# Patient Record
Sex: FEMALE | Race: OTHER | Employment: OTHER | URBAN - METROPOLITAN AREA
[De-identification: names, ages, dates, MRNs, and addresses within clinical notes are randomized per-mention and may not be internally consistent; named-entity substitution may affect disease eponyms.]

---

## 2020-10-29 ENCOUNTER — HOSPITAL ENCOUNTER (OUTPATIENT)
Dept: GENERAL RADIOLOGY | Age: 80
Discharge: HOME | End: 2020-10-29
Payer: MEDICARE

## 2020-10-29 ENCOUNTER — HOSPITAL ENCOUNTER (OUTPATIENT)
Dept: MRI IMAGING | Age: 80
Discharge: HOME | End: 2020-10-29
Payer: MEDICARE

## 2020-10-29 DIAGNOSIS — M19.90 OSTEOARTHRITIS, UNSPECIFIED OSTEOARTHRITIS TYPE, UNSPECIFIED SITE: ICD-10-CM

## 2020-10-29 DIAGNOSIS — M54.16 LUMBAR RADICULOPATHY: ICD-10-CM

## 2020-10-29 PROCEDURE — 72148 MRI LUMBAR SPINE W/O DYE: CPT | Performed by: PAIN MEDICINE

## 2020-10-29 PROCEDURE — 73502 X-RAY EXAM HIP UNI 2-3 VIEWS: CPT | Performed by: PAIN MEDICINE

## 2021-07-15 ENCOUNTER — HOSPITAL ENCOUNTER (OUTPATIENT)
Dept: MRI IMAGING | Age: 81
Discharge: HOME | End: 2021-07-15
Payer: MEDICARE

## 2021-07-15 DIAGNOSIS — M54.50 LUMBAR PAIN: ICD-10-CM

## 2021-07-15 DIAGNOSIS — M54.16 LUMBAR RADICULOPATHY: ICD-10-CM

## 2021-07-15 DIAGNOSIS — M54.50 LOW BACK PAIN: ICD-10-CM

## 2021-07-15 DIAGNOSIS — M25.551 RIGHT HIP PAIN: ICD-10-CM

## 2021-07-15 PROCEDURE — 72146 MRI CHEST SPINE W/O DYE: CPT | Performed by: PHYSICIAN ASSISTANT

## 2021-07-15 PROCEDURE — 72146 MRI CHEST SPINE W/O DYE: CPT

## 2021-07-15 PROCEDURE — 73721 MRI JNT OF LWR EXTRE W/O DYE: CPT

## 2021-07-23 ENCOUNTER — HOSPITAL ENCOUNTER (OUTPATIENT)
Dept: MRI IMAGING | Facility: HOSPITAL | Age: 81
Discharge: HOME | End: 2021-07-23
Payer: MEDICARE

## 2021-07-23 DIAGNOSIS — M54.16 LUMBAR RADICULOPATHY: ICD-10-CM

## 2021-07-23 DIAGNOSIS — M54.50 LUMBAR PAIN: ICD-10-CM

## 2021-07-23 DIAGNOSIS — M25.551 RIGHT HIP PAIN: ICD-10-CM

## 2021-07-23 PROCEDURE — 73721 MRI JNT OF LWR EXTRE W/O DYE: CPT

## 2021-07-23 PROCEDURE — 73721 MRI JNT OF LWR EXTRE W/O DYE: CPT | Performed by: PHYSICIAN ASSISTANT

## 2021-08-04 DIAGNOSIS — M16.9 OSTEOARTHROSIS, HIP: Primary | ICD-10-CM

## 2021-09-02 ENCOUNTER — OFFICE VISIT (OUTPATIENT)
Dept: PAIN CLINIC | Facility: HOSPITAL | Age: 81
End: 2021-09-02
Attending: FAMILY MEDICINE
Payer: MEDICARE

## 2021-09-02 VITALS
HEIGHT: 64 IN | BODY MASS INDEX: 33.29 KG/M2 | RESPIRATION RATE: 18 BRPM | HEART RATE: 69 BPM | SYSTOLIC BLOOD PRESSURE: 128 MMHG | DIASTOLIC BLOOD PRESSURE: 69 MMHG | WEIGHT: 195 LBS

## 2021-09-02 DIAGNOSIS — M16.9 OSTEOARTHROSIS, HIP: ICD-10-CM

## 2021-09-02 DIAGNOSIS — M48.062 SPINAL STENOSIS OF LUMBAR REGION WITH NEUROGENIC CLAUDICATION: Primary | ICD-10-CM

## 2021-09-02 PROCEDURE — 99202 OFFICE O/P NEW SF 15 MIN: CPT

## 2021-09-02 RX ORDER — HYDROCODONE BITARTRATE AND ACETAMINOPHEN 5; 325 MG/1; MG/1
TABLET ORAL 2 TIMES DAILY PRN
COMMUNITY
Start: 2021-08-30 | End: 2021-09-02

## 2021-09-02 RX ORDER — SENNA PLUS 8.6 MG/1
3 TABLET ORAL DAILY
COMMUNITY

## 2021-09-02 RX ORDER — OMEPRAZOLE 20 MG/1
20 CAPSULE, DELAYED RELEASE ORAL DAILY
COMMUNITY
Start: 2021-07-13

## 2021-09-02 RX ORDER — LETROZOLE 2.5 MG/1
TABLET, FILM COATED ORAL
COMMUNITY

## 2021-09-02 RX ORDER — SPIRONOLACTONE 25 MG/1
TABLET ORAL DAILY
COMMUNITY
End: 2021-09-02

## 2021-09-02 RX ORDER — NYSTATIN 100000 [USP'U]/G
POWDER TOPICAL
COMMUNITY
Start: 2021-05-24

## 2021-09-02 RX ORDER — SPIRONOLACTONE 25 MG/1
25 TABLET ORAL DAILY
COMMUNITY

## 2021-09-02 RX ORDER — HYDROCODONE BITARTRATE AND ACETAMINOPHEN 5; 325 MG/1; MG/1
TABLET ORAL EVERY 6 HOURS PRN
COMMUNITY
End: 2021-09-02

## 2021-09-02 RX ORDER — ACETAMINOPHEN 500 MG
500 TABLET ORAL EVERY 6 HOURS PRN
COMMUNITY

## 2021-09-02 RX ORDER — FUROSEMIDE 40 MG/1
40 TABLET ORAL DAILY
COMMUNITY
Start: 2021-08-12

## 2021-09-02 RX ORDER — MIRTAZAPINE 30 MG/1
30 TABLET, FILM COATED ORAL NIGHTLY
COMMUNITY
Start: 2021-08-12

## 2021-09-02 RX ORDER — PRAVASTATIN SODIUM 40 MG
TABLET ORAL
COMMUNITY

## 2021-09-02 RX ORDER — CLOBETASOL PROPIONATE 0.05 G/100ML
SHAMPOO TOPICAL
COMMUNITY
Start: 2021-06-29

## 2021-09-02 RX ORDER — FLUCONAZOLE 150 MG/1
150 TABLET ORAL DAILY
COMMUNITY
Start: 2021-05-24

## 2021-09-02 RX ORDER — GABAPENTIN 600 MG/1
600 TABLET ORAL 3 TIMES DAILY
COMMUNITY

## 2021-09-02 NOTE — PROGRESS NOTES
0/2/[id number] in [place] accompanied by her -Sarath, and care giver Yonathan Orozco; New consult c/oLBP INTO RT HIP/RT GROIN  RADIATING TO THE  ANTERIOR RLE;  Pt. Reports this has been groin on for many years; Hx of cancer-breast, lung, kidney;   And bladder;

## 2021-09-02 NOTE — CHRONIC PAIN
Initial Consultation Note      HISTORY OF PRESENT ILLNESS:  Meenu Brothers is a 80year old old female referred to the pain clinic  for evaluation treatment of her right hip pain as well as her right leg pain Poornima Strickland is a 80-year-old white female with a a lot • Osteoarthritis    • Osteoporosis    • Personal history of antineoplastic chemotherapy        FAMILY HISTORY:  History reviewed. No pertinent family history.     SOCIAL HISTORY:  Social History    Socioeconomic History      Marital status:       S S1, S2, RRR  Respiratory: CTAB  Gait: non mb; cane user - No  Spine: Kyphosis    ROM:   Lumbar spine  Flexion dec  Extension=dec  Cervical Spine  Flexion   Extension  MOTOR EXAMINATION:  UPPER EXTREMITY      LEFT RIGHT   Deltoid 5/5 5/5   Biceps 5/5 5/5 time  Comprehensive analgesic plan was formulated. Conservative vs. Aggressive measures were discussed at length including pharmacotherapy (eg.  Anti- inflammatories, muscle relaxants, neuropathic medications, oral steroids, analgesics), injections, and fur

## 2021-09-08 ENCOUNTER — DOCUMENTATION ONLY (OUTPATIENT)
Dept: PAIN CLINIC | Facility: HOSPITAL | Age: 81
End: 2021-09-08

## 2021-09-08 ENCOUNTER — TELEPHONE (OUTPATIENT)
Dept: PAIN CLINIC | Facility: HOSPITAL | Age: 81
End: 2021-09-08

## 2021-09-08 NOTE — PROGRESS NOTES
Procedure code 58248---96/10/2021 APPROVED  Follow up apt 09/24/2021 at 11am    Medicare-- No PA needed per pt's insurance  Order form faxed to the surgery center.

## 2021-09-30 ENCOUNTER — DOCUMENTATION ONLY (OUTPATIENT)
Dept: PAIN CLINIC | Facility: HOSPITAL | Age: 81
End: 2021-09-30

## 2021-09-30 NOTE — PROGRESS NOTES
Procedure code 64262---51/13/2021 APPROVED  Follow up 10/28/2021  At 1pm      Medicare---No PA needed per pt's insurance  Order form faxed to the surgery center.